# Patient Record
Sex: FEMALE | Race: BLACK OR AFRICAN AMERICAN | NOT HISPANIC OR LATINO | Employment: UNEMPLOYED | URBAN - METROPOLITAN AREA
[De-identification: names, ages, dates, MRNs, and addresses within clinical notes are randomized per-mention and may not be internally consistent; named-entity substitution may affect disease eponyms.]

---

## 2019-06-22 ENCOUNTER — HOSPITAL ENCOUNTER (EMERGENCY)
Facility: HOSPITAL | Age: 19
Discharge: HOME | End: 2019-06-22
Attending: FAMILY MEDICINE
Payer: MEDICAID

## 2019-06-22 VITALS
SYSTOLIC BLOOD PRESSURE: 136 MMHG | TEMPERATURE: 98 F | HEIGHT: 64 IN | WEIGHT: 272.69 LBS | RESPIRATION RATE: 20 BRPM | BODY MASS INDEX: 46.55 KG/M2 | OXYGEN SATURATION: 98 % | DIASTOLIC BLOOD PRESSURE: 69 MMHG | HEART RATE: 86 BPM

## 2019-06-22 DIAGNOSIS — R52 PAIN: ICD-10-CM

## 2019-06-22 DIAGNOSIS — S93.601A SPRAIN OF RIGHT FOOT, INITIAL ENCOUNTER: Primary | ICD-10-CM

## 2019-06-22 PROCEDURE — 99283 EMERGENCY DEPT VISIT LOW MDM: CPT

## 2019-06-22 RX ORDER — FLUTICASONE PROPIONATE 50 MCG
1 SPRAY, SUSPENSION (ML) NASAL
COMMUNITY
Start: 2019-03-07 | End: 2020-03-06

## 2019-06-22 NOTE — ED PROVIDER NOTES
SCRIBE #1 NOTE: I, Nolberto Suarez, am scribing for, and in the presence of, Carla Harmon NP. I have scribed the entire note.       History     Chief Complaint   Patient presents with    Foot Injury     R foot pain with swelling x2 weeks     Review of patient's allergies indicates:   Allergen Reactions    Amoxicillin Rash         History of Present Illness     HPI    6/22/2019, 5:54 PM  History obtained from the patient      History of Present Illness: Jimenez Bunn is a 18 y.o. female patient who presents to the Emergency Department for evaluation of R foot pain which onset gradually 2 weeks ago. Symptoms are constant and moderate in severity. No mitigating or exacerbating factors reported. Associated sxs include R foot swelling. Patient denies any R ankle pain, R knee pain, hip pain, back pain, gait problem, n/v, and all other sxs at this time. No prior Tx reported. Pt expressed concern of a possible R foot fracture. Pt admits moving furniture immediately prior to onset of sxs. Pt denies any pain when bearing weight on R foot. No further complaints or concerns at this time.         Arrival mode: Personal vehicle    PCP: Abilio Bermudez Jr, MD      Past Medical History:  History reviewed. No pertinent medical history.    Past Surgical History:  History reviewed. No pertinent surgical history.    Family History:  History reviewed. No pertinent family history.    Social History:  Social History Main Topics    Smoking status: Unknown if ever smoked    Smokeless tobacco: Unknown if ever used    Alcohol Use: Unknown drinking history    Drug Use: Unknown if ever used    Sexual Activity: Unknown        Review of Systems     Review of Systems   Constitutional: Negative for fever.   HENT: Negative for sore throat.    Respiratory: Negative for shortness of breath.    Cardiovascular: Negative for chest pain.   Gastrointestinal: Negative for nausea and vomiting.   Genitourinary: Negative for dysuria.    Musculoskeletal: Negative for arthralgias (R ankle, hip, R knee), back pain, gait problem, joint swelling (R ankle), neck pain and neck stiffness.        (+) R foot pain  (+) R foot edema     Skin: Negative for rash.   Neurological: Negative for dizziness, weakness and light-headedness.   Hematological: Does not bruise/bleed easily.   All other systems reviewed and are negative.       Physical Exam     Initial Vitals [06/22/19 1736]   BP Pulse Resp Temp SpO2   136/69 86 20 98.1 °F (36.7 °C) 98 %      MAP       --          Physical Exam  Nursing Notes and Vital Signs Reviewed.  Constitutional: Patient is in no acute distress. Well-developed and well-nourished.  Head: Atraumatic. Normocephalic.  Eyes: PERRL. EOM intact. Conjunctivae are not pale. No scleral icterus.  ENT: Mucous membranes are moist. Oropharynx is clear and symmetric.    Neck: Supple. Full ROM. No lymphadenopathy.  Cardiovascular: Regular rate. Regular rhythm. No murmurs, rubs, or gallops. Distal pulses are 2+ and symmetric.  Pulmonary/Chest: No respiratory distress. Clear to auscultation bilaterally. No wheezing or rales.  Abdominal: Soft and non-distended.  There is no tenderness.  No rebound, guarding, or rigidity. Good bowel sounds.  Genitourinary: No CVA tenderness  Musculoskeletal: Moves all extremities. No obvious deformities. No calf tenderness.  Skin: Warm and dry.  Neurological:  Alert, awake, and appropriate.  Normal speech.  No acute focal neurological deficits are appreciated.  Psychiatric: Normal affect. Good eye contact. Appropriate in content.  RLE: no evident deformity. Positive for foot swelling. Positive for anterior foot tenderness. ROM is normal. Cap refill distally is <2 seconds. DP and PT pulses are equal and 2+ bilaterally. No motor deficit. No distal sensory deficit       ED Course   Procedures  ED Vital Signs:  Vitals:    06/22/19 1736   BP: 136/69   Pulse: 86   Resp: 20   Temp: 98.1 °F (36.7 °C)   TempSrc: Oral   SpO2: 98%  "  Weight: 123.7 kg (272 lb 11.3 oz)   Height: 5' 4" (1.626 m)       Abnormal Lab Results:  Labs Reviewed - No data to display     All Lab Results:  None    Imaging Results:  Imaging Results          X-Ray Ankle Complete Left (Final result)  Result time 06/22/19 18:12:39    Final result by Guy Powers III, MD (06/22/19 18:12:39)                 Impression:      No acute findings.      Electronically signed by: Guy Powers MD  Date:    06/22/2019  Time:    18:12             Narrative:    EXAMINATION:  XR ANKLE COMPLETE 3 VIEW LEFT    CLINICAL HISTORY:  Pain, unspecified    FINDINGS:  Bone alignment is satisfactory.  No fracture or dislocation.  No advanced arthritic change.  No significant soft tissue findings.                               X-Ray Foot Complete Left (Final result)  Result time 06/22/19 18:17:11    Final result by Guy Powers III, MD (06/22/19 18:17:11)                 Impression:      No acute findings.      Electronically signed by: Guy Powers MD  Date:    06/22/2019  Time:    18:17             Narrative:    EXAMINATION:  XR FOOT COMPLETE 3 VIEW LEFT    CLINICAL HISTORY:  Pain, unspecified    FINDINGS:  Bone alignment is satisfactory.  No fracture or dislocation.  No advanced arthritic change.  No significant soft tissue findings.                                    ED Course as of Jun 22 1911   Sat Jun 22, 2019   1909 The pt xray was ordered as left foot in error. Radiology tech took xray of right foot and ankle, and no acute findings noted. Unable to change order from Left to Right in chart.     [JL]      ED Course User Index  [JL] Carla Harmon NP         The Emergency Provider reviewed the vital signs and test results, which are outlined above.     ED Discussion     6:33 PM: Reassessed pt at this time.  Pt states her condition has improved at this time. Discussed with pt all pertinent ED information and results. Discussed pt dx and plan of tx. Gave pt all f/u and return to the " ED instructions. All questions and concerns were addressed at this time. Pt expresses understanding of information and instructions, and is comfortable with plan to discharge. Pt is stable for discharge.    I discussed with patient and/or family/caretaker that evaluation in the ED does not suggest any emergent or life threatening medical conditions requiring immediate intervention beyond what was provided in the ED, and I believe patient is safe for discharge.  Regardless, an unremarkable evaluation in the ED does not preclude the development or presence of a serious of life threatening condition. As such, patient was instructed to return immediately for any worsening or change in current symptoms.    I discussed with patient and/or family/caretaker that negative X-ray does not rule out occult fracture or other soft tissue injury.  Persistent pain greater than 7-10 days or increased pain requires follow up, specifically with orthopedics.       ED Medication(s):  Medications - No data to display    Discharge Medication List as of 6/22/2019  6:28 PM          Follow-up Information     Abilio Bermudez Jr, MD In 1 week.    Specialty:  Pediatrics  Why:  Follow up with your doctor for further evaluation, Return to ED for any concerns.  Contact information:  [address]  [address]  [place] [address]  [phone]             Nathalie Cross MD In 1 week.    Specialty:  Orthopedic Surgery  Why:  Follow up with Orthopedic of your choice for further evaluation of right foot sprain  Contact information:  [id number] [place] [address] [address] [id number]  [phone]                         Medical Decision Making:   Clinical Tests:   Radiological Study: Ordered and Reviewed             Scribe Attestation:   Scribe #1: I performed the above scribed service and the documentation accurately describes the services I performed. I attest to the accuracy of the note.     Attending:   Physician Attestation Statement for Scribe #1:  I, Carla Harmon NP, personally performed the services described in this documentation, as scribed by Nolberto Suarez, in my presence, and it is both accurate and complete.           Clinical Impression       ICD-10-CM ICD-9-CM   1. Sprain of right foot, initial encounter S93.601A 845.10   2. Pain R52 780.96       Disposition:   Disposition: Discharged  Condition: Stable         Carla Harmon NP  06/22/19 1911

## 2022-10-20 PROBLEM — K02.9 CARIES: Status: ACTIVE | Noted: 2022-10-20
